# Patient Record
Sex: MALE | Race: WHITE | NOT HISPANIC OR LATINO | Employment: UNEMPLOYED | URBAN - METROPOLITAN AREA
[De-identification: names, ages, dates, MRNs, and addresses within clinical notes are randomized per-mention and may not be internally consistent; named-entity substitution may affect disease eponyms.]

---

## 2017-04-03 ENCOUNTER — TELEPHONE (OUTPATIENT)
Dept: HEPATOLOGY | Facility: CLINIC | Age: 52
End: 2017-04-03

## 2017-04-03 NOTE — TELEPHONE ENCOUNTER
MA called patient wife back, schedule patient labs, US and follow up visit mailed appt reminder to patient.MARISSA

## 2017-04-03 NOTE — TELEPHONE ENCOUNTER
----- Message from Suki Payton sent at 4/3/2017  2:42 PM CDT -----  Contact: wife/  Calling to schedule appt for hep first available something preferable around 11:30 lives out of town please call her @ # [phone].

## 2017-04-04 DIAGNOSIS — K76.82 HEPATIC ENCEPHALOPATHY: ICD-10-CM

## 2017-04-04 DIAGNOSIS — D69.6 THROMBOCYTOPENIA: ICD-10-CM

## 2017-04-04 DIAGNOSIS — K70.31 ALCOHOLIC CIRRHOSIS OF LIVER WITH ASCITES: Primary | ICD-10-CM

## 2017-04-11 ENCOUNTER — TELEPHONE (OUTPATIENT)
Dept: HEPATOLOGY | Facility: CLINIC | Age: 52
End: 2017-04-11

## 2017-04-11 ENCOUNTER — OFFICE VISIT (OUTPATIENT)
Dept: HEPATOLOGY | Facility: CLINIC | Age: 52
End: 2017-04-11
Payer: MEDICAID

## 2017-04-11 ENCOUNTER — HOSPITAL ENCOUNTER (OUTPATIENT)
Dept: RADIOLOGY | Facility: HOSPITAL | Age: 52
Discharge: HOME | End: 2017-04-11
Attending: NURSE PRACTITIONER
Payer: MEDICAID

## 2017-04-11 VITALS
HEART RATE: 88 BPM | OXYGEN SATURATION: 97 % | BODY MASS INDEX: 26.58 KG/M2 | TEMPERATURE: 99 F | WEIGHT: 179.44 LBS | DIASTOLIC BLOOD PRESSURE: 65 MMHG | SYSTOLIC BLOOD PRESSURE: 128 MMHG | RESPIRATION RATE: 18 BRPM | HEIGHT: 69 IN

## 2017-04-11 DIAGNOSIS — K70.31 ALCOHOLIC CIRRHOSIS OF LIVER WITH ASCITES: ICD-10-CM

## 2017-04-11 DIAGNOSIS — K76.82 HEPATIC ENCEPHALOPATHY: ICD-10-CM

## 2017-04-11 DIAGNOSIS — D69.6 THROMBOCYTOPENIA: ICD-10-CM

## 2017-04-11 DIAGNOSIS — K70.30 ALCOHOLIC CIRRHOSIS OF LIVER WITHOUT ASCITES: Primary | ICD-10-CM

## 2017-04-11 PROCEDURE — 76700 US EXAM ABDOM COMPLETE: CPT | Mod: 26,,, | Performed by: RADIOLOGY

## 2017-04-11 PROCEDURE — 99213 OFFICE O/P EST LOW 20 MIN: CPT | Mod: S$PBB,,, | Performed by: INTERNAL MEDICINE

## 2017-04-11 PROCEDURE — 99213 OFFICE O/P EST LOW 20 MIN: CPT | Mod: PBBFAC | Performed by: INTERNAL MEDICINE

## 2017-04-11 PROCEDURE — 99999 PR PBB SHADOW E&M-EST. PATIENT-LVL III: CPT | Mod: PBBFAC,,, | Performed by: INTERNAL MEDICINE

## 2017-04-11 RX ORDER — LACTULOSE 10 G/15ML
10 SOLUTION ORAL; RECTAL 2 TIMES DAILY
Qty: 1892 ML | Refills: 3 | Status: SHIPPED | OUTPATIENT
Start: 2017-04-11

## 2017-04-11 RX ORDER — RIFAXIMIN 550 MG/1
TABLET ORAL
Qty: 60 TABLET | Refills: 3 | Status: SHIPPED | OUTPATIENT
Start: 2017-04-11

## 2017-04-11 NOTE — TELEPHONE ENCOUNTER
Received a call from Brionna Agustin with [facility] with a Critical Lab Value. Platelets 32,000. Read Back and Verified. Dr Yasmany Lyon in clinic with the patient and notified. DB

## 2017-04-11 NOTE — PROGRESS NOTES
HEPATOLOGY FOLLOW UP    Referring Physician:   Current Corresponding Physician:     Cecil Perdomo is here for follow up of Follow-up and Cirrhosis      HPI  Cecil Perdomo was seen in the Hepatology Clinic in followup with respect to his   alcoholic cirrhosis.  He is a 51-year-old gentleman who presented with hepatic   decompensation in the form of ascites and encephalopathy last August.  He has a   history of heavy alcohol consumption, but completely abstinence since February 2016.  With continued abstinence, his ascites and encephalopathy have become   controlled.  He is now off diuretics.  His encephalopathy is well controlled   with Xifaxan and lactulose.  He has no ankle swelling.  He had an abdominal   ultrasound today, which showed no focal mass, lesions or ascites.  I note that   he has significant thrombocytopenia.  It is not clear to me whether he has had   an upper endoscopy to screen for varices.      NG/HN  dd: 04/11/2017 15:06:55 (CDT)  td: 04/12/2017 08:46:15 (CDT)  Doc ID   #[id number]  Job ID #[id number]    CC:         Outpatient Encounter Prescriptions as of 4/11/2017   Medication Sig Dispense Refill    lactulose (CHRONULAC) 10 gram/15 mL solution Take 15 mLs (10 g total) by mouth 2 (two) times daily. 1892 mL 3    metoprolol tartrate (LOPRESSOR) 25 MG tablet Take 25 mg by mouth as needed.       XIFAXAN 550 mg Tab TK 1 T PO  BID 60 tablet 3    [DISCONTINUED] lactulose (CHRONULAC) 10 gram/15 mL solution Take 10 g by mouth 2 (two) times daily.       [DISCONTINUED] XIFAXAN 550 mg Tab TK 1 T PO  BID  0    [DISCONTINUED] ciprofloxacin HCl (CIPRO) 750 MG tablet Take 750 mg by mouth every 12 (twelve) hours.       [DISCONTINUED] dicyclomine (BENTYL) 10 MG capsule Take 10 mg by mouth as needed.       [DISCONTINUED] ferrous sulfate 325 mg (65 mg iron) Tab tablet Take 325 mg by mouth once daily.       [DISCONTINUED] furosemide (LASIX) 40 MG tablet Take 40 mg by mouth once daily.       [DISCONTINUED]  oxycodone-acetaminophen (PERCOCET) 5-325 mg per tablet Take 1 tablet by mouth every 4 (four) hours as needed.       [DISCONTINUED] pantoprazole (PROTONIX) 40 MG tablet Take 40 mg by mouth as needed.       [DISCONTINUED] potassium chloride (KLOR-CON) 10 MEQ TbSR 10 mEq once.       [DISCONTINUED] spironolactone (ALDACTONE) 25 MG tablet Take 25 mg by mouth 2 (two) times daily.       [DISCONTINUED] spironolactone (ALDACTONE) 50 MG tablet       [DISCONTINUED] tramadol (ULTRAM) 50 mg tablet Take 50 mg by mouth every 4 (four) hours as needed.        No facility-administered encounter medications on file as of 4/11/2017.      Review of patient's allergies indicates:   Allergen Reactions    Morphine Other (See Comments)     Feel sick     History reviewed. No pertinent past medical history.    Review of Systems   Constitutional: Negative for activity change, appetite change, chills, fatigue and unexpected weight change.   HENT: Negative for congestion, facial swelling and tinnitus.    Eyes: Negative for visual disturbance.   Respiratory: Negative for cough, shortness of breath and wheezing.    Cardiovascular: Negative for chest pain and palpitations.   Gastrointestinal: Negative for abdominal distention.   Genitourinary: Negative for dysuria.   Musculoskeletal: Negative for arthralgias, joint swelling and myalgias.   Skin:        Mild pruritus   Neurological: Negative for syncope and headaches.   Hematological: Does not bruise/bleed easily.   Psychiatric/Behavioral: Negative for confusion.     Vitals:    04/11/17 1444   BP: 128/65   Pulse: 88   Resp: 18   Temp: 98.6 °F (37 °C)       Physical Exam   Constitutional: He is oriented to person, place, and time. He appears well-developed and well-nourished.   Eyes: No scleral icterus.   Cardiovascular: Normal rate, regular rhythm and normal heart sounds.    Pulmonary/Chest: Effort normal and breath sounds normal. No respiratory distress. He has no wheezes.   Abdominal: Soft.  Bowel sounds are normal. He exhibits no distension and no mass. There is hepatosplenomegaly. There is no tenderness. There is no rebound.       Musculoskeletal: Normal range of motion.   Lymphadenopathy:     He has no cervical adenopathy.   Neurological: He is alert and oriented to person, place, and time.   Skin: Skin is warm and dry.       MELD-Na score: 12 at 4/11/2017 12:50 PM  MELD score: 12 at 4/11/2017 12:50 PM  Calculated from:  Serum Creatinine: 0.7 mg/dL (Rounded to 1) at 4/11/2017 12:50 PM  Serum Sodium: 142 mmol/L (Rounded to 137) at 4/11/2017 12:50 PM  Total Bilirubin: 2.1 mg/dL at 4/11/2017 12:50 PM  INR(ratio): 1.3 at 4/11/2017 12:50 PM  Age: 51 years    Lab Results   Component Value Date     04/11/2017    BUN 7 04/11/2017    CREATININE 0.7 04/11/2017    CALCIUM 8.7 04/11/2017     04/11/2017    K 3.5 04/11/2017     04/11/2017    PROT 5.9 (L) 04/11/2017    CO2 23 04/11/2017    ANIONGAP 9 04/11/2017    WBC 3.11 (L) 04/11/2017    RBC 4.18 (L) 04/11/2017    HGB 13.1 (L) 04/11/2017    HCT 36.2 (L) 04/11/2017    MCV 87 04/11/2017    MCH 31.3 (H) 04/11/2017    MCHC 36.2 (H) 04/11/2017     Lab Results   Component Value Date    RDW 16.9 (H) 04/11/2017    PLT 32 (LL) 04/11/2017    MPV 11.2 04/11/2017    GRAN 1.9 04/11/2017    GRAN 59.5 04/11/2017    LYMPH 0.8 (L) 04/11/2017    LYMPH 25.1 04/11/2017    MONO 0.3 04/11/2017    MONO 9.3 04/11/2017    EOSINOPHIL 5.1 04/11/2017    BASOPHIL 1.0 04/11/2017    EOS 0.2 04/11/2017    BASO 0.03 04/11/2017    ALBUMIN 3.6 04/11/2017    AST 45 (H) 04/11/2017    ALT 26 04/11/2017    ALKPHOS 119 04/11/2017    LABPROT 13.8 (H) 04/11/2017    INR 1.3 (H) 04/11/2017       Assessment and Plan:  Patient Active Problem List   Diagnosis    Alcoholic cirrhosis of liver without ascites    Thrombocytopenia    Hepatic encephalopathy     Cecil Perdomo is a 51 y.o. male withFollow-up and Cirrhosis    Encouraged continued abstinence!  RTC in 6 months with continued  clinical, biochemical and ultrasound surveillance.  Will schedule EGD.

## 2017-04-17 ENCOUNTER — TELEPHONE (OUTPATIENT)
Dept: HEPATOLOGY | Facility: CLINIC | Age: 52
End: 2017-04-17

## 2017-04-17 NOTE — TELEPHONE ENCOUNTER
----- Message from Yasmany Lyon MD sent at 4/12/2017 12:40 PM CDT -----  Results reviewed. Please advise labs are stable.

## 2017-06-30 ENCOUNTER — TELEPHONE (OUTPATIENT)
Dept: HEPATOLOGY | Facility: CLINIC | Age: 52
End: 2017-06-30

## 2017-06-30 NOTE — TELEPHONE ENCOUNTER
----- Message from Suki Payton sent at 6/26/2017 10:00 AM CDT -----  Contact: pt  Had a distended gallbladder and had a tube placed in. Needs to speak with someone about this and trying to figure out if they think removal of gallbladder is needed or what should be done in regards to his liver situation please call her @ # [phone].

## 2017-06-30 NOTE — TELEPHONE ENCOUNTER
Returned call, spoke with patient's wife. Patient's wife says she was calling to schedule an appointment for her . Says her  is , he did not die of medical condition.She says her  was killed on  17.